# Patient Record
(demographics unavailable — no encounter records)

---

## 2024-10-11 NOTE — CONSULT LETTER
[Dear  ___] : Dear  [unfilled], [Courtesy Letter:] : I had the pleasure of seeing your patient, [unfilled], in my office today. [Please see my note below.] : Please see my note below. [Referral Closing:] : Thank you very much for seeing this patient.  If you have any questions, please do not hesitate to contact me. [Sincerely,] : Sincerely, [FreeTextEntry3] : Jonah Bishop PA-C

## 2024-10-11 NOTE — PHYSICAL EXAM
[Midline] : trachea located in midline position [Removed] : palatine tonsils previously removed [Normal] : no rashes [de-identified] : thyroid nodule [Hearing Loss Right Only] : normal [Hearing Loss Left Only] : normal [de-identified] : tonsillar tag

## 2024-10-11 NOTE — HISTORY OF PRESENT ILLNESS
[de-identified] : Patient presents to office for ENT check up. 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 States she suffers from headaches and doesnt know if the headache she currently has is due to sinus issues or not. She denies any nasal congestion, runny nose, difficulty breathing through the nose, clogged ear sensation, throat pain or difficulty swallowing.

## 2024-10-11 NOTE — ASSESSMENT
[FreeTextEntry1] : Reviewed and reconciled medications, allergies, PMHx, PSHx, SocHx, FMHx.      physical exam: right ear: looks normal left ear: looks normal Inflamed turbinates tmj tenderness  Procedure:  Flexible Nasal Endoscopy with a Look at the Larynx: Risks, benefits, and alternatives of flexible endoscopy were explained to the patient. The patient gave oral consent to proceed. The flexible scope was inserted into the right nasal cavity.  Endoscopy of the inferior and middle meatus was performed.  No polyp, mass, or lesion was appreciated.  Olfactory cleft was clear. Spheno-ethmoid recess clear. Nasopharynx was clear.  Turbinates were without mass. Oropharyngeal walls were symmetric and mobile without lesion, mass, or edema.  Hypopharynx was also without  lesion or edema.  Larynx was mobile without lesions. Supraglottic structures were free of edema, mass, and asymmetry.  True vocal folds were white without mass or lesion.  Base of tongue was within normal limits. The patient tolerated the procedure well     Plan: No active sinus issues seen except inflamed turbinats will start Flonase - 2 sprays bilaterally QD, spray laterally. Follow up with Dr. Mccormick     Case discussed with Dr. Lloyd

## 2025-01-23 NOTE — ASSESSMENT
[FreeTextEntry1] : POST AURICULAR DISCOMFORT X TODAY WITH NO OTHER SYMPTOMS ZOSTER DISCUSSED DUE TO HER NEUROSURGICAL INTERVENTION AND MENINGIOMA PAIN MONITORING DISCUSSED WILL CONSIDER CT IF SYMPTOMS CONTINUE F/U NEXT WEEK F/U PMD

## 2025-01-23 NOTE — HISTORY OF PRESENT ILLNESS
[de-identified] : LEFT POST AURICULAR DISCOMFORT X TODAY NO FEVER NO RASH NO HEAINR CHANGE NO HX OF TRAUMA

## 2025-01-23 NOTE — PHYSICAL EXAM
[de-identified] : MILD DISCOMFORT [Normal] : mucosa is normal [Midline] : trachea located in midline position [de-identified] : MISSING TEETH